# Patient Record
Sex: MALE | Race: BLACK OR AFRICAN AMERICAN | NOT HISPANIC OR LATINO | URBAN - METROPOLITAN AREA
[De-identification: names, ages, dates, MRNs, and addresses within clinical notes are randomized per-mention and may not be internally consistent; named-entity substitution may affect disease eponyms.]

---

## 2020-08-05 ENCOUNTER — EMERGENCY (EMERGENCY)
Facility: HOSPITAL | Age: 50
LOS: 1 days | Discharge: HOME | End: 2020-08-05
Attending: EMERGENCY MEDICINE | Admitting: EMERGENCY MEDICINE
Payer: COMMERCIAL

## 2020-08-05 VITALS
RESPIRATION RATE: 18 BRPM | SYSTOLIC BLOOD PRESSURE: 116 MMHG | HEART RATE: 93 BPM | DIASTOLIC BLOOD PRESSURE: 77 MMHG | OXYGEN SATURATION: 98 % | TEMPERATURE: 97 F

## 2020-08-05 LAB
ALBUMIN SERPL ELPH-MCNC: 3.2 G/DL — LOW (ref 3.3–5)
ALP SERPL-CCNC: 85 U/L — SIGNIFICANT CHANGE UP (ref 40–120)
ALT FLD-CCNC: 163 U/L — HIGH (ref 4–41)
ANION GAP SERPL CALC-SCNC: 14 MMO/L — SIGNIFICANT CHANGE UP (ref 7–14)
APPEARANCE UR: CLEAR — SIGNIFICANT CHANGE UP
APTT BLD: 24.3 SEC — LOW (ref 27–36.3)
AST SERPL-CCNC: 184 U/L — HIGH (ref 4–40)
BACTERIA # UR AUTO: HIGH
BASE EXCESS BLDV CALC-SCNC: 3.8 MMOL/L — SIGNIFICANT CHANGE UP
BILIRUB SERPL-MCNC: 0.6 MG/DL — SIGNIFICANT CHANGE UP (ref 0.2–1.2)
BILIRUB UR-MCNC: NEGATIVE — SIGNIFICANT CHANGE UP
BLOOD GAS VENOUS - CREATININE: 1.01 MG/DL — SIGNIFICANT CHANGE UP (ref 0.5–1.3)
BLOOD UR QL VISUAL: SIGNIFICANT CHANGE UP
BUN SERPL-MCNC: 16 MG/DL — SIGNIFICANT CHANGE UP (ref 7–23)
CALCIUM SERPL-MCNC: 8 MG/DL — LOW (ref 8.4–10.5)
CHLORIDE BLDV-SCNC: 105 MMOL/L — SIGNIFICANT CHANGE UP (ref 96–108)
CHLORIDE SERPL-SCNC: 99 MMOL/L — SIGNIFICANT CHANGE UP (ref 98–107)
CO2 SERPL-SCNC: 23 MMOL/L — SIGNIFICANT CHANGE UP (ref 22–31)
COLOR SPEC: YELLOW — SIGNIFICANT CHANGE UP
CREAT SERPL-MCNC: 1.01 MG/DL — SIGNIFICANT CHANGE UP (ref 0.5–1.3)
GAS PNL BLDV: 133 MMOL/L — LOW (ref 136–146)
GLUCOSE BLDV-MCNC: 124 MG/DL — HIGH (ref 70–99)
GLUCOSE SERPL-MCNC: 127 MG/DL — HIGH (ref 70–99)
GLUCOSE UR-MCNC: NEGATIVE — SIGNIFICANT CHANGE UP
HCO3 BLDV-SCNC: 26 MMOL/L — SIGNIFICANT CHANGE UP (ref 20–27)
HCT VFR BLD CALC: 37.5 % — LOW (ref 39–50)
HCT VFR BLDV CALC: 38.5 % — LOW (ref 39–51)
HGB BLD-MCNC: 11.8 G/DL — LOW (ref 13–17)
HGB BLDV-MCNC: 12.5 G/DL — LOW (ref 13–17)
HYALINE CASTS # UR AUTO: NEGATIVE — SIGNIFICANT CHANGE UP
INR BLD: 1.4 — HIGH (ref 0.88–1.16)
KETONES UR-MCNC: SIGNIFICANT CHANGE UP
LACTATE BLDV-MCNC: 1.5 MMOL/L — SIGNIFICANT CHANGE UP (ref 0.5–2)
LEUKOCYTE ESTERASE UR-ACNC: SIGNIFICANT CHANGE UP
MCHC RBC-ENTMCNC: 26.3 PG — LOW (ref 27–34)
MCHC RBC-ENTMCNC: 31.5 % — LOW (ref 32–36)
MCV RBC AUTO: 83.5 FL — SIGNIFICANT CHANGE UP (ref 80–100)
NITRITE UR-MCNC: NEGATIVE — SIGNIFICANT CHANGE UP
NRBC # FLD: 0 K/UL — SIGNIFICANT CHANGE UP (ref 0–0)
PCO2 BLDV: 48 MMHG — SIGNIFICANT CHANGE UP (ref 41–51)
PH BLDV: 7.39 PH — SIGNIFICANT CHANGE UP (ref 7.32–7.43)
PH UR: 6.5 — SIGNIFICANT CHANGE UP (ref 5–8)
PLATELET # BLD AUTO: 135 K/UL — LOW (ref 150–400)
PMV BLD: 9.3 FL — SIGNIFICANT CHANGE UP (ref 7–13)
PO2 BLDV: < 24 MMHG — LOW (ref 35–40)
POTASSIUM BLDV-SCNC: 3.9 MMOL/L — SIGNIFICANT CHANGE UP (ref 3.4–4.5)
POTASSIUM SERPL-MCNC: 4.5 MMOL/L — SIGNIFICANT CHANGE UP (ref 3.5–5.3)
POTASSIUM SERPL-SCNC: 4.5 MMOL/L — SIGNIFICANT CHANGE UP (ref 3.5–5.3)
PROT SERPL-MCNC: 6.4 G/DL — SIGNIFICANT CHANGE UP (ref 6–8.3)
PROT UR-MCNC: 30 — SIGNIFICANT CHANGE UP
PROTHROM AB SERPL-ACNC: 15.8 SEC — HIGH (ref 10.6–13.6)
RBC # BLD: 4.49 M/UL — SIGNIFICANT CHANGE UP (ref 4.2–5.8)
RBC # FLD: 12.8 % — SIGNIFICANT CHANGE UP (ref 10.3–14.5)
RBC CASTS # UR COMP ASSIST: SIGNIFICANT CHANGE UP (ref 0–?)
SAO2 % BLDV: 32.8 % — LOW (ref 60–85)
SODIUM SERPL-SCNC: 136 MMOL/L — SIGNIFICANT CHANGE UP (ref 135–145)
SP GR SPEC: 1.02 — SIGNIFICANT CHANGE UP (ref 1–1.04)
SQUAMOUS # UR AUTO: SIGNIFICANT CHANGE UP
TSH SERPL-MCNC: 3.71 UIU/ML — SIGNIFICANT CHANGE UP (ref 0.27–4.2)
UROBILINOGEN FLD QL: NORMAL — SIGNIFICANT CHANGE UP
WBC # BLD: 4.02 K/UL — SIGNIFICANT CHANGE UP (ref 3.8–10.5)
WBC # FLD AUTO: 4.02 K/UL — SIGNIFICANT CHANGE UP (ref 3.8–10.5)
WBC UR QL: >50 — HIGH (ref 0–?)

## 2020-08-05 PROCEDURE — 99284 EMERGENCY DEPT VISIT MOD MDM: CPT

## 2020-08-05 RX ORDER — CEPHALEXIN 500 MG
500 CAPSULE ORAL ONCE
Refills: 0 | Status: COMPLETED | OUTPATIENT
Start: 2020-08-05 | End: 2020-08-05

## 2020-08-05 RX ADMIN — Medication 500 MILLIGRAM(S): at 23:44

## 2020-08-05 NOTE — ED PROVIDER NOTE - PHYSICAL EXAMINATION
Gen: WDWN, NAD  HEENT: EOMI, no nasal discharge, mucous membranes moist  CV: RRR, +S1/S2, no M/R/G  Resp: CTAB, no W/R/R  GI: Abdomen soft non-distended, NTTP, no masses  MSK: No open wounds, no bruising, no lower extremity edema  Neuro: A&Ox4, following commands, moving all four extremities spontaneously  Psych: appropriate mood, denies AH, VH, SI Gen: WDWN, NAD  HEENT: EOMI, no nasal discharge, mucous membranes moist  CV: RRR, +S1/S2, no M/R/G  Resp: CTAB, no W/R/R  GI: Abdomen soft non-distended, NTTP, no masses  MSK: No open wounds, no bruising, no lower extremity edema  Neuro: Tremulous. Slight facial droop on the L with nasolabial fold flattening. A&Ox4, following commands, moving all four extremities spontaneously. Sensation intact b/l. Strength 5/5. Gait normal  Psych: appropriate mood

## 2020-08-05 NOTE — ED PROVIDER NOTE - CARE PLAN
Principal Discharge DX:	Diarrhea  Secondary Diagnosis:	Urinary tract infection  Secondary Diagnosis:	Neurological deficit present

## 2020-08-05 NOTE — ED ADULT NURSE NOTE - OBJECTIVE STATEMENT
p/t is a 50y old male received awake and responsive, c/o of diffuse abd discomfort and diarrhea for few days, p/t denies any cough or sob, bloods drawn and sent to lab, no further c/o noted will continue to monitor

## 2020-08-05 NOTE — ED ADULT TRIAGE NOTE - CHIEF COMPLAINT QUOTE
Patient sent from [facility] for complaints of dehydration & malaise, 5 days diarrhea, weakness, decreased po intake.  Denies abdominal pain, headaches, chest pain, dizziness, neuro deficit.  Denies fevers/chills/sick contacts.  Per EMS o2sat was 92% on room air,  initially.  Placed IV to L AC #20g, given 500cc NS prior to arrival, 2L NC and sat improved to 98%.  by EMS.

## 2020-08-05 NOTE — ED PROVIDER NOTE - OBJECTIVE STATEMENT
49 yo M no known PMHx c/o 3 days hx of diarrhea and feeling slow with slowed speech and slower movements. He denies any trauma, injury, visual disturbances, numbness or tingling in the extremities, or difficulty moving. Also denies any recent contacts, n/v, fevers or chills, blood in the stool, or recent use of abx. 49 yo M no known PMHx c/o 3 days hx of diarrhea and feeling slow with slowed speech and slower movements. He denies any trauma, injury, visual disturbances, numbness or tingling in the extremities, or difficulty walking. Denies any recent contacts, n/v, fevers or chills, blood in the stool, or recent use of abx. Denies c/p, SOB.

## 2020-08-05 NOTE — ED PROVIDER NOTE - PATIENT PORTAL LINK FT
You can access the FollowMyHealth Patient Portal offered by Health system by registering at the following website: [email or web address]. By joining [employer]’s FollowMyHealth portal, you will also be able to view your health information using other applications (apps) compatible with our system.

## 2020-08-05 NOTE — ED PROVIDER NOTE - CLINICAL SUMMARY MEDICAL DECISION MAKING FREE TEXT BOX
Pt c/o diarrhea and weakness that is improved after IVF possibly due to dehydration. Concern for TIA given facial droop and hx of slurred speech - will get head CT to evaluate. Labs. Keflex for UTI Pt c/o diarrhea and weakness that is improved after IVF possibly due to dehydration. Diarrhea most likely due to GI viral syndrome.  Concern for TIA given facial droop and hx of slurred speech - will get head CT to evaluate. Labs. Keflex for UTI.

## 2020-08-05 NOTE — ED PROVIDER NOTE - PROGRESS NOTE DETAILS
patient reports improvement of symptoms after IVF. Informed pt on urinalysis results for UTI - will give keflex. Waiting CT results Call placed to neuro for consult - will evaluate pt Per neuro, patient ok to be d/c and f/u with neurology outpatient Dr Murray Cline. Marian, PGY1 – Pt was re-evaluated at bedside, VSS, feeling well overall. Return precautions and follow up plan with PCP and/or specialist were discussed. Time was taken to answer any questions that the patient had before providing them with discharge paperwork.

## 2020-08-05 NOTE — ED PROVIDER NOTE - NSFOLLOWUPINSTRUCTIONS_ED_ALL_ED_FT
You were seen for diarrhea and weakness.     Drink plenty of fluids throughout the day and stay hydrated. Take your keflex antibiotic medication twice a day for 7 days to treat your urinary tract infection. Please schedule an appointment with Dr Murray Cline within the next 5 days to follow up with and get a brain MRI: [phone].    For pain or fever you can ibuprofen (motrin, advil) or tylenol as needed, as directed on packaging.     If you had labs or imaging done, you were given copies of all of the available results. If anything is pending to result or pending official read, you will receive a call if results are positive.    If needed, call patient access services at [phone] to find a primary care doctor, or call at [phone] to make an appointment at the clinic.    Return to the ER for any worsening symptoms or concerns, including chest pain, shortness of breath, fever, chills.

## 2020-08-05 NOTE — ED PROVIDER NOTE - ATTENDING CONTRIBUTION TO CARE
I have personally performed a face to face bedside history and physical examination of this patient. I have discussed the history, examination, review of systems, assessment and plan of management with the resident. I have reviewed the electronic medical record and amended it to reflect my history, review of systems, physical exam, assessment and plan.    Brief HPI:  51 yo M no known PMHx c/o 3 days hx of diarrhea and feeling slow with slowed speech and slower movements.  Denies fever.  Sent from urgent care prior to arrival where facial droop was appreciated.  Patient reports improvement in sx since arrival to ED.      Vitals:   Reviewed    Exam:    GEN:  Non-toxic appearing, non-distressed, speaking full sentences, non-diaphoretic, AAOx3  HEENT:  NCAT, neck supple, EOMI, PERRLA, sclera anicteric, no conjunctival pallor or injection, no stridor, normal voice, no tonsillar exudate, uvula midline, tympanic membranes and external auditory canals normal appearing bilaterally   CV:  regular rhythm and rate, s1/s2 audible, no murmurs, rubs or gallops, peripheral pulses 2+ and symmetric  PULM:  non-labored respirations, lungs clear to auscultation bilaterally, no wheezes, crackles or rales  ABD:  non distended, non-tender, no rebound, no guarding, negative Mckenzie's sign, bowel sounds normal, no cvat  MSK:  no gross deformity, non-tender extremities and joints, range of motion grossly normal appearing, no extremity edema, extremities warm and well perfused   NEURO:  AAOx3, mild left sided nasolabial fold flattening, no other CN deficit, motor 5/5 in upper and lower extremities bilaterally, sensation grossly intact in extremities and trunk, finger to nose testing wnl, no nystagmus, negative Romberg, no pronator drift, no gait deficit  SKIN:  warm, dry, no rash or vesicles     A/P:  51 yo M no known PMHx c/o 3 days hx of diarrhea and feeling slow with slowed speech and slower movements. VSS AF.  Exam non-toxic appearing, no focal neuro deficit, non-tender abdomen.  No c/f acute surgical pathology of abdomen.  No focal findings on neuro exam to suggest GBS.   TIA considered, although low c/f overall. I have personally performed a face to face bedside history and physical examination of this patient. I have discussed the history, examination, review of systems, assessment and plan of management with the resident. I have reviewed the electronic medical record and amended it to reflect my history, review of systems, physical exam, assessment and plan.    Brief HPI:  49 yo M no known PMHx c/o 3 days hx of diarrhea and feeling slow with slowed speech and slower movements.  Denies fever.  Sent from urgent care prior to arrival where facial droop was appreciated.  Patient reports improvement in sx since arrival to ED.      Vitals:   Reviewed    Exam:    GEN:  Non-toxic appearing, non-distressed, speaking full sentences, non-diaphoretic, AAOx3  HEENT:  NCAT, neck supple, EOMI, PERRLA, sclera anicteric, no conjunctival pallor or injection, no stridor, normal voice, no tonsillar exudate, uvula midline, tympanic membranes and external auditory canals normal appearing bilaterally   CV:  regular rhythm and rate, s1/s2 audible, no murmurs, rubs or gallops, peripheral pulses 2+ and symmetric  PULM:  non-labored respirations, lungs clear to auscultation bilaterally, no wheezes, crackles or rales  ABD:  non distended, non-tender, no rebound, no guarding, negative Mckenzie's sign, bowel sounds normal, no cvat  MSK:  no gross deformity, non-tender extremities and joints, range of motion grossly normal appearing, no extremity edema, extremities warm and well perfused   NEURO:  AAOx3, mild left sided nasolabial fold flattening, no other CN deficit, motor 5/5 in upper and lower extremities bilaterally, sensation grossly intact in extremities and trunk, finger to nose testing wnl, no nystagmus, negative Romberg, no pronator drift, no gait deficit  SKIN:  warm, dry, no rash or vesicles     A/P:  49 yo M no known PMHx c/o 3 days hx of diarrhea and feeling slow with slowed speech and slower movements. VSS AF.  Exam non-toxic appearing, no focal neuro deficit, non-tender abdomen.  No c/f acute surgical pathology of abdomen.  No focal findings on neuro exam to suggest GBS.   TIA considered, although low c/f overall.  Will obtain labs, ct head, neuro c/s.  Dispo pending.

## 2020-08-05 NOTE — ED ADULT NURSE NOTE - CHIEF COMPLAINT QUOTE
Patient sent from [facility] for complaints of dehydration & malaise, 5 days diarrhea, weakness, decreased po intake.  Denies abdominal pain, headaches, chest pain, dizziness, neuro deficit.  Denies fevers/chills/sick contacts.  Per EMS o2sat was 92% on room air,  initially.  Placed IV to L AC #20g, given 500cc NS prior to arrival, 2L NC and sat improved to 98%.  by EMS.

## 2020-08-05 NOTE — ED PROVIDER NOTE - CARE PROVIDER_API CALL
Murray Cline  NEUROLOGY  [address] [facility] [address]  [place] [address]  Phone: [phone]  Fax: [phone]  Follow Up Time:

## 2020-08-05 NOTE — ED ADULT NURSE NOTE - NSIMPLEMENTINTERV_GEN_ALL_ED
Implemented All Universal Safety Interventions:  [place] to call system. Call bell, personal items and telephone within reach. Instruct patient to call for assistance. Room bathroom lighting operational. Non-slip footwear when patient is off stretcher. Physically safe environment: no spills, clutter or unnecessary equipment. Stretcher in lowest position, wheels locked, appropriate side rails in place.

## 2020-08-06 VITALS
OXYGEN SATURATION: 100 % | RESPIRATION RATE: 17 BRPM | DIASTOLIC BLOOD PRESSURE: 76 MMHG | TEMPERATURE: 98 F | SYSTOLIC BLOOD PRESSURE: 111 MMHG | HEART RATE: 82 BPM

## 2020-08-06 PROCEDURE — 70450 CT HEAD/BRAIN W/O DYE: CPT | Mod: 26

## 2020-08-06 RX ORDER — CEPHALEXIN 500 MG
1 CAPSULE ORAL
Qty: 14 | Refills: 0
Start: 2020-08-06 | End: 2020-08-12

## 2020-08-06 NOTE — CONSULT NOTE ADULT - ASSESSMENT
Assessment: 49 yo male with no known PMHx presents to the ED with diarrhea for 3 days, dehydration, and feeling slow with slowed speech.    Impression: Left facial droop, LUE dysmetria in setting of patient with UTI and several days of diarrhea. Possibly secondary to recrudescence of previously silent infarcts vs metabolic derangements.    Plan:  [] Recommend ASA 81 MG PO daily for now.  [] Outpatient follow up with Dr. Derek Cline for neurovascular workup, [phone]  [] Rest of management per primary team    TO BE DISCUSSED WITH NEUROLOGY ATTENDING DR. DEVINE. Assessment: 51 yo male with no known PMHx presents to the ED with diarrhea for 3 days, dehydration, and feeling slow with slowed speech.    Impression: Left facial droop, LUE dysmetria in setting of patient with UTI and several days of diarrhea. Possibly secondary to recrudescence of previously silent infarcts vs metabolic derangements.    Plan:  [] Recommend ASA 81 MG PO daily for now.  [] Outpatient follow up with Dr. Derek Cline for neurovascular workup, [phone]  [] Rest of management per primary team    DISCUSSED WITH NEUROLOGY ATTENDING DR. DEVINE AND ED TEAM.

## 2020-08-06 NOTE — CONSULT NOTE ADULT - SUBJECTIVE AND OBJECTIVE BOX
Patient is a 49 yo Male who presents with a chief complaint of diarrhea for 3 days.    HPI: 49 yo male with no known PMHx presents to the ED with diarrhea for 3 days, dehydration, and feeling slow with slurred speech. Was noted to have mild L facial droop in the ED. Afebrile, WBC not elevated but UA positive. Started on Keflex and IVF by ED team. Patient states his speech returned to baseline after IVF. Of note, patient reports he had some teeth removed on the left side within the past year. He states he has not noticed any facial asymmetry. He does not take antiplatelets/anticoagulants at home. At the time of my exam the patient states he is feeling much better and that his speech has returned to baseline. Patient denies HA, dizziness, changes in vision, tinnitus, numbness, tingling, difficulty walking.    Allergies  No Known Allergies    ROS: Pertinent positives in HPI, all other ROS were reviewed and are negative.      Vital Signs Last 24 Hrs  T(C): 36.8 (06 Aug 2020 00:03), Max: 36.9 (05 Aug 2020 20:49)  T(F): 98.2 (06 Aug 2020 00:03), Max: 98.5 (05 Aug 2020 20:49)  HR: 82 (06 Aug 2020 00:03) (80 - 98)  BP: 111/76 (06 Aug 2020 00:03) (111/76 - 129/84)  RR: 17 (06 Aug 2020 00:03) (17 - 18)  SpO2: 100% (06 Aug 2020 00:03) (98% - 100%)    NEUROLOGICAL EXAM:  MS: AAOX3, attends b/l; recent and remote memory intact; normal attention. Language: Speech is clear, fluent with good repetition & comprehension (able to name objects).   CN: EOMI, PERRLA, no JENNY, no APD, V1-3 intact to LT bilaterally, mild left facial droop, t/p midline.  Motor: Strength: 5/5 4x, no drift 4x. Bulk: normal. Sensation: intact to LT 4x.   Coordination: Mild dysmetria in LUE with FTN testing; no dysmetria in RUE on FTN, no dysmetria in b/l lower extremities on HTS testing    Labs:   cbc                      11.8   4.02  )-----------( 135      ( 05 Aug 2020 18:43 )             37.5     [id number]    136  |  99  |  16  ----------------------------<  127<H>  4.5   |  23  |  1.01    Ca    8.0<L>      05 Aug 2020 18:43    TPro  6.4  /  Alb  3.2<L>  /  TBili  0.6  /  DBili  x   /  AST  184<H>  /  ALT  163<H>  /  AlkPhos  85  08-05    CoagsPT/INR - ( 05 Aug 2020 18:43 )   PT: 15.8 SEC;   INR: 1.40       PTT - ( 05 Aug 2020 18:43 )  PTT:24.3 SEC    LFTs LIVER FUNCTIONS - ( 05 Aug 2020 18:43 )  Alb: 3.2 g/dL / Pro: 6.4 g/dL / ALK PHOS: 85 u/L / ALT: 163 u/L / AST: 184 u/L           UAUrinalysis Basic - ( 05 Aug 2020 22:10 )    Color: YELLOW / Appearance: CLEAR / S.016 / pH: 6.5  Gluc: NEGATIVE / Ketone: TRACE  / Bili: NEGATIVE / Urobili: NORMAL   Blood: TRACE / Protein: 30 / Nitrite: NEGATIVE   Leuk Esterase: LARGE / RBC: 3-5 / WBC >50   Sq Epi: OCC / Non Sq Epi: x / Bacteria: MODERATE    Radiology:  CT Head w/o contrast: No acute intracranial hemorrhage, large cortical infarct or mass effect. If clinically indicated, short-term follow-up or MRI may be obtained for further evaluation.

## 2020-08-06 NOTE — ED ADULT NURSE REASSESSMENT NOTE - NS ED NURSE REASSESS COMMENT FT1
Patient CT complete. Pt seen by neuro, awaiting disposition at this time. Respirations remain even and unlabored, no new/worsening S&S. Will continue to monitor.

## 2020-08-07 ENCOUNTER — TRANSCRIPTION ENCOUNTER (OUTPATIENT)
Age: 50
End: 2020-08-07

## 2020-08-09 RX ORDER — MOXIFLOXACIN HYDROCHLORIDE TABLETS, 400 MG 400 MG/1
1 TABLET, FILM COATED ORAL
Qty: 10 | Refills: 0
Start: 2020-08-09 | End: 2020-08-13